# Patient Record
Sex: MALE | Race: ASIAN | NOT HISPANIC OR LATINO
[De-identification: names, ages, dates, MRNs, and addresses within clinical notes are randomized per-mention and may not be internally consistent; named-entity substitution may affect disease eponyms.]

---

## 2019-03-04 PROBLEM — Z00.00 ENCOUNTER FOR PREVENTIVE HEALTH EXAMINATION: Status: ACTIVE | Noted: 2019-03-04

## 2019-03-07 ENCOUNTER — APPOINTMENT (OUTPATIENT)
Dept: UROLOGY | Facility: CLINIC | Age: 59
End: 2019-03-07
Payer: COMMERCIAL

## 2019-03-07 VITALS
OXYGEN SATURATION: 97 % | SYSTOLIC BLOOD PRESSURE: 117 MMHG | RESPIRATION RATE: 18 BRPM | TEMPERATURE: 97.4 F | WEIGHT: 182 LBS | HEART RATE: 71 BPM | HEIGHT: 66.5 IN | DIASTOLIC BLOOD PRESSURE: 75 MMHG | BODY MASS INDEX: 28.9 KG/M2

## 2019-03-07 DIAGNOSIS — Z78.9 OTHER SPECIFIED HEALTH STATUS: ICD-10-CM

## 2019-03-07 DIAGNOSIS — E11.9 TYPE 2 DIABETES MELLITUS W/OUT COMPLICATIONS: ICD-10-CM

## 2019-03-07 DIAGNOSIS — Z80.0 FAMILY HISTORY OF MALIGNANT NEOPLASM OF DIGESTIVE ORGANS: ICD-10-CM

## 2019-03-07 PROCEDURE — 76872 US TRANSRECTAL: CPT

## 2019-03-07 PROCEDURE — 99204 OFFICE O/P NEW MOD 45 MIN: CPT | Mod: 25

## 2019-03-07 RX ORDER — METFORMIN HYDROCHLORIDE 850 MG/1
850 TABLET, COATED ORAL
Qty: 60 | Refills: 0 | Status: ACTIVE | COMMUNITY
Start: 2019-01-19

## 2019-03-07 RX ORDER — TAMSULOSIN HYDROCHLORIDE 0.4 MG/1
0.4 CAPSULE ORAL
Qty: 60 | Refills: 0 | Status: ACTIVE | COMMUNITY
Start: 2019-03-03

## 2019-03-07 RX ORDER — GLIPIZIDE 5 MG/1
5 TABLET, FILM COATED, EXTENDED RELEASE ORAL
Qty: 90 | Refills: 0 | Status: ACTIVE | COMMUNITY
Start: 2018-12-19

## 2019-03-07 RX ORDER — PIOGLITAZONE HYDROCHLORIDE 15 MG/1
15 TABLET ORAL
Qty: 60 | Refills: 0 | Status: ACTIVE | COMMUNITY
Start: 2019-01-19

## 2019-03-07 RX ORDER — PIOGLITAZONE HYDROCHLORIDE AND METFORMIN HYDROCHLORIDE 15; 850 MG/1; MG/1
15-850 TABLET, FILM COATED ORAL
Qty: 60 | Refills: 0 | Status: ACTIVE | COMMUNITY
Start: 2019-02-25

## 2019-03-07 RX ORDER — GLIPIZIDE 5 MG/1
5 TABLET ORAL
Qty: 30 | Refills: 0 | Status: ACTIVE | COMMUNITY
Start: 2018-12-11

## 2019-03-07 NOTE — ASSESSMENT
[FreeTextEntry1] : Patient is a 60 yo M who presents with BPH/LUTS. DERRICK with BPH. Improved modestly with flomax 0.8.\par \par PVR today acceptable\par PSA today\par Prostate sono showed 87cc gland\par Discussed with pt adding finasteride for combination medical therapy, d/w pt that will take 4-6 mos see any effect, and he would require lifelong use.  Additionally d/w pt side effect\par D/w pt BPH surgeries - including TURP, laser vaporization, enucleation ie HoLEP, SPP (open or robotic) \par D/w pt that there is a new technology for BPH to use water vapor Rezum which can reduce the size and has significantly lower side effect profile.  However long term data is lacking.\par After discussion pt is interestedin Rezum.  Will contact to schedule as explained only in our [place] office and would be performed under local anesthesia transrectal/transurethral.  Also d/w pt that he may have dysuria/irritative voiding for 1 month after\par

## 2019-03-07 NOTE — HISTORY OF PRESENT ILLNESS
[FreeTextEntry1] : Patient is a 60 yo M who presents for BPH/LUTS - difficulty voiding, straining, weak stream, nocturia 3-4 (improved to 1 on flomax 0.8), sensation of incomplete emptying and postvoid dribbling.\par \par He reports LUTS have worsened over past 6 months, he was started on flomax and notes taking 0.8 mg at night has helped.\par \par  [Urinary Incontinence] : no urinary incontinence [Dysuria] : no dysuria [Hematuria - Gross] : no gross hematuria [Flank Pain] : no flank pain

## 2019-03-19 LAB
APPEARANCE: CLEAR
BILIRUBIN URINE: NEGATIVE
BLOOD URINE: NEGATIVE
COLOR: COLORLESS
GLUCOSE QUALITATIVE U: ABNORMAL
KETONES URINE: NEGATIVE
LEUKOCYTE ESTERASE URINE: NEGATIVE
NITRITE URINE: NEGATIVE
PH URINE: 6
PROTEIN URINE: NEGATIVE
PSA SERPL-MCNC: 3.66 NG/ML
SPECIFIC GRAVITY URINE: 1.01
UROBILINOGEN URINE: NORMAL

## 2019-04-23 ENCOUNTER — OTHER (OUTPATIENT)
Age: 59
End: 2019-04-23

## 2019-04-23 ENCOUNTER — APPOINTMENT (OUTPATIENT)
Dept: UROLOGY | Facility: CLINIC | Age: 59
End: 2019-04-23

## 2019-04-23 ENCOUNTER — APPOINTMENT (OUTPATIENT)
Dept: UROLOGY | Facility: CLINIC | Age: 59
End: 2019-04-23
Payer: COMMERCIAL

## 2019-04-23 VITALS
DIASTOLIC BLOOD PRESSURE: 81 MMHG | HEART RATE: 77 BPM | RESPIRATION RATE: 16 BRPM | SYSTOLIC BLOOD PRESSURE: 135 MMHG | TEMPERATURE: 97.6 F

## 2019-04-23 PROCEDURE — 53854Z: CUSTOM

## 2019-04-23 RX ORDER — IBUPROFEN 600 MG/1
600 TABLET, FILM COATED ORAL
Qty: 30 | Refills: 0 | Status: ACTIVE | COMMUNITY
Start: 2019-04-23 | End: 1900-01-01

## 2019-04-26 ENCOUNTER — APPOINTMENT (OUTPATIENT)
Dept: UROLOGY | Facility: CLINIC | Age: 59
End: 2019-04-26
Payer: COMMERCIAL

## 2019-04-26 PROCEDURE — 99024 POSTOP FOLLOW-UP VISIT: CPT

## 2019-04-27 ENCOUNTER — MESSAGE (OUTPATIENT)
Age: 59
End: 2019-04-27

## 2019-04-29 NOTE — HISTORY OF PRESENT ILLNESS
[FreeTextEntry1] : Patient is a 60 yo M who presents for BPH/LUTS - difficulty voiding, straining, weak stream, nocturia 3-4 (improved to 1 on flomax 0.8), sensation of incomplete emptying and postvoid dribbling.\par \par He reports LUTS have worsened over past 6 months, he was started on flomax and notes taking 0.8 mg at night has helped.\par \par He is now s/p Rezum water vaporization.  Here for catheter removal.  He notes urine color is still light hematuric.\par \par

## 2019-04-29 NOTE — ADDENDUM
[FreeTextEntry1] : Failed TOV\par Voided small amounts and PVR 250cc\par New sterile 16 leonard replaced\par F/u next wk for TOV

## 2019-04-29 NOTE — PHYSICAL EXAM
[General Appearance - Well Developed] : well developed [General Appearance - Well Nourished] : well nourished [Normal Appearance] : normal appearance [Well Groomed] : well groomed [Urinary Bladder Findings] : the bladder was normal on palpation [General Appearance - In No Acute Distress] : no acute distress [Scrotum] : the scrotum was normal [FreeTextEntry1] : leonard with light pink urine = draining clear no clots

## 2019-04-30 ENCOUNTER — APPOINTMENT (OUTPATIENT)
Dept: UROLOGY | Facility: CLINIC | Age: 59
End: 2019-04-30

## 2019-05-02 ENCOUNTER — APPOINTMENT (OUTPATIENT)
Dept: UROLOGY | Facility: CLINIC | Age: 59
End: 2019-05-02
Payer: COMMERCIAL

## 2019-05-02 VITALS
TEMPERATURE: 98 F | SYSTOLIC BLOOD PRESSURE: 151 MMHG | HEART RATE: 66 BPM | RESPIRATION RATE: 16 BRPM | DIASTOLIC BLOOD PRESSURE: 97 MMHG

## 2019-05-02 PROCEDURE — 99024 POSTOP FOLLOW-UP VISIT: CPT | Mod: GT

## 2019-05-02 NOTE — ASSESSMENT
[FreeTextEntry1] : Bladder filled, catheter removed.\par Was able to void initially.\par \par 2 hours later returned to office with symptoms of retention.\par 18 Fr Coude Mendoza placed with drainage of 400 mL clear urine.\par \par Follow up with Dr. Castellon next week.\par Continue tamsulosin.

## 2019-05-02 NOTE — HISTORY OF PRESENT ILLNESS
[FreeTextEntry1] : s/p Rex prostate vaporization 4/23/2019.\par Here for catheter removal.\par Urine is clear.  Uncomfortable with catheter.

## 2019-05-02 NOTE — PHYSICAL EXAM
[FreeTextEntry1] : Mild distress [Normal Appearance] : normal appearance [Abdomen Soft] : soft [Costovertebral Angle Tenderness] : no ~M costovertebral angle tenderness [Abdomen Tenderness] : non-tender [Urinary Bladder Findings] : the bladder was normal on palpation

## 2019-05-03 ENCOUNTER — APPOINTMENT (OUTPATIENT)
Dept: UROLOGY | Facility: CLINIC | Age: 59
End: 2019-05-03
Payer: COMMERCIAL

## 2019-05-03 PROCEDURE — 99024 POSTOP FOLLOW-UP VISIT: CPT

## 2019-05-03 RX ORDER — OXYBUTYNIN CHLORIDE 5 MG/1
5 TABLET ORAL TWICE DAILY
Qty: 28 | Refills: 0 | Status: ACTIVE | COMMUNITY
Start: 2019-05-03 | End: 1900-01-01

## 2019-05-03 NOTE — ASSESSMENT
[FreeTextEntry1] : Mr. Cummigns presented today with complaints of his catheter not draining.  A new 18 Fr coude was placed yesterday by Dr. Mao\par -No visible clots seen in urinary drainage bag.\par -As per patient he last emptied the bag 2 hours ago and there was noted to be 200 cc clear yellow urine\par -Catheter irrigated without any difficulty with 300 cc sterile water, no resistance met, no clots or blood visible during irrigation.\par -New drainage bag attached to catheter and clear urine noted to be draining.\par -Discussed he may be experiencing some spasms related to procedure and number of times he has had to have a catheter reinserted.\par -Continue Flomax\par -Trail of Oxybutynin\par -Instructed to call office if catheter stops draining, he develops a fever, or large amounts of blood are draining into the catheter bag.\par \par instructed to follow up with Dr. Castellon on Friday

## 2019-05-03 NOTE — HISTORY OF PRESENT ILLNESS
[FreeTextEntry1] : CC: Urinary catheter not drianing\par \par Patient returns to the office today stating his catheter is not working.  He reports since he left the office yesterday he feels like it drains "ok" and then he has to push to get the urine out.  When he pushes to urinate he notices some urine leaks out around the catheter.\par He also noted blood clots in the urinary drainage bag.\par Mr. Cummings had Rezum prostate vaporization 4/23/19 and as per the patient he has had a catheter placed 5 different times since then.  \par He was prescribed an antibiotic by his son who is a Doctor who works in an ICU.  Currently afebrile, denies any nausea or vomiting.

## 2019-05-03 NOTE — PHYSICAL EXAM
[General Appearance - Well Developed] : well developed [General Appearance - Well Nourished] : well nourished [General Appearance - In No Acute Distress] : no acute distress [Normal Appearance] : normal appearance [Costovertebral Angle Tenderness] : no ~M costovertebral angle tenderness [Abdomen Soft] : soft [Abdomen Tenderness] : non-tender [] : no respiratory distress [Urinary Bladder Findings] : the bladder was normal on palpation [Oriented To Time, Place, And Person] : oriented to person, place, and time [Exaggerated Use Of Accessory Muscles For Inspiration] : no accessory muscle use

## 2019-05-07 ENCOUNTER — MOBILE ON CALL (OUTPATIENT)
Age: 59
End: 2019-05-07

## 2019-05-10 ENCOUNTER — APPOINTMENT (OUTPATIENT)
Dept: UROLOGY | Facility: CLINIC | Age: 59
End: 2019-05-10
Payer: COMMERCIAL

## 2019-05-10 VITALS
HEART RATE: 76 BPM | TEMPERATURE: 97.9 F | SYSTOLIC BLOOD PRESSURE: 125 MMHG | RESPIRATION RATE: 16 BRPM | DIASTOLIC BLOOD PRESSURE: 81 MMHG

## 2019-05-10 PROCEDURE — 99024 POSTOP FOLLOW-UP VISIT: CPT

## 2019-05-10 NOTE — HISTORY OF PRESENT ILLNESS
[FreeTextEntry1] : Patient is a 58 yo M who presents for BPH/LUTS - difficulty voiding, straining, weak stream, nocturia 3-4 (improved to 1 on flomax 0.8), sensation of incomplete emptying and postvoid dribbling.\par \par He reports LUTS have worsened over past 6 months, he was started on flomax and notes taking 0.8 mg at night has helped.\par \par He is now s/p Rezum water vaporization 4/23/19.  Here for catheter removal.  Urine color has cleared.  He has failed multiple TOVs.  No fever/chills.  He took few days of abx last wk and then restarted last night - was given by his son.\par

## 2019-05-10 NOTE — PHYSICAL EXAM
[General Appearance - Well Developed] : well developed [General Appearance - Well Nourished] : well nourished [Normal Appearance] : normal appearance [Well Groomed] : well groomed [General Appearance - In No Acute Distress] : no acute distress [Urethral Meatus] : meatus normal [Urinary Bladder Findings] : the bladder was normal on palpation [Scrotum] : the scrotum was normal [FreeTextEntry1] : leonard in place with clear yellow urine

## 2019-05-10 NOTE — ASSESSMENT
[FreeTextEntry1] : Patient is a 60 yo M who presents with BPH/LUTS. DERRICK with BPH. Improved modestly with flomax 0.8.\par \par BPH s/p Rezum\par TOV today unsuccessful \par Catheter replaced\par F/u next wk for repeat TOV

## 2019-05-16 ENCOUNTER — APPOINTMENT (OUTPATIENT)
Dept: UROLOGY | Facility: CLINIC | Age: 59
End: 2019-05-16
Payer: COMMERCIAL

## 2019-05-16 VITALS
BODY MASS INDEX: 29.57 KG/M2 | RESPIRATION RATE: 16 BRPM | SYSTOLIC BLOOD PRESSURE: 111 MMHG | HEART RATE: 65 BPM | TEMPERATURE: 97.5 F | OXYGEN SATURATION: 98 % | WEIGHT: 186 LBS | DIASTOLIC BLOOD PRESSURE: 73 MMHG

## 2019-05-16 PROCEDURE — 99024 POSTOP FOLLOW-UP VISIT: CPT

## 2019-05-16 PROCEDURE — 76872 US TRANSRECTAL: CPT

## 2019-05-16 NOTE — ASSESSMENT
[FreeTextEntry1] : Patient is a 60 yo M who presents with BPH/LUTS. DERRICK with BPH. Improved modestly with flomax 0.8.\par \par BPH s/p Rezum\par TOV today - again unsuccessful\par D/w pt self cathing, he is not interested\par New sterile leonard replaced\par Instructed to stop oxybutynin\par Will check urine for cx\par F/u 1 wk

## 2019-05-16 NOTE — HISTORY OF PRESENT ILLNESS
[FreeTextEntry1] : Patient is a 58 yo M who presents for BPH/LUTS - difficulty voiding, straining, weak stream, nocturia 3-4 (improved to 1 on flomax 0.8), sensation of incomplete emptying and postvoid dribbling.\par \par He reports LUTS have worsened over past 6 months, he was started on flomax and notes taking 0.8 mg at night has helped.\par \par He is now s/p Rezum water vaporization 4/23/19.  Here for catheter removal.  Urine color is clear yellow.  He has failed multiple TOVs.  No fever/chills.

## 2019-05-16 NOTE — PHYSICAL EXAM
[General Appearance - Well Nourished] : well nourished [General Appearance - Well Developed] : well developed [Normal Appearance] : normal appearance [Well Groomed] : well groomed [Urethral Meatus] : meatus normal [General Appearance - In No Acute Distress] : no acute distress [Scrotum] : the scrotum was normal [Urinary Bladder Findings] : the bladder was normal on palpation [FreeTextEntry1] : leonard in place with clear yellow urine

## 2019-05-20 LAB — BACTERIA UR CULT: NORMAL

## 2019-05-24 ENCOUNTER — APPOINTMENT (OUTPATIENT)
Dept: UROLOGY | Facility: CLINIC | Age: 59
End: 2019-05-24
Payer: COMMERCIAL

## 2019-05-24 PROCEDURE — 99024 POSTOP FOLLOW-UP VISIT: CPT

## 2019-05-24 PROCEDURE — 51798 US URINE CAPACITY MEASURE: CPT

## 2019-05-24 NOTE — ASSESSMENT
[FreeTextEntry1] : Patient is a 58 yo M who presents with BPH/LUTS. DERRICK with BPH. Improved modestly with flomax 0.8.\par \par BPH s/p Rezum\par TOV today  - successful\par He was able to void multiple times and felt empty\par PVR 100cc\par Voided 800cc in total\par F/u 3 wks

## 2019-05-24 NOTE — PHYSICAL EXAM
[General Appearance - Well Developed] : well developed [General Appearance - Well Nourished] : well nourished [Normal Appearance] : normal appearance [Well Groomed] : well groomed [General Appearance - In No Acute Distress] : no acute distress [Urethral Meatus] : meatus normal [Urinary Bladder Findings] : the bladder was normal on palpation [Scrotum] : the scrotum was normal [FreeTextEntry1] : leonard with clear yellow urine - removed with ease

## 2019-06-14 ENCOUNTER — APPOINTMENT (OUTPATIENT)
Dept: UROLOGY | Facility: CLINIC | Age: 59
End: 2019-06-14
Payer: COMMERCIAL

## 2019-06-14 VITALS
RESPIRATION RATE: 16 BRPM | TEMPERATURE: 97.8 F | SYSTOLIC BLOOD PRESSURE: 117 MMHG | DIASTOLIC BLOOD PRESSURE: 72 MMHG | HEART RATE: 78 BPM

## 2019-06-14 LAB
BILIRUB UR QL STRIP: NEGATIVE
CLARITY UR: CLEAR
COLLECTION METHOD: NORMAL
GLUCOSE UR-MCNC: NORMAL
HCG UR QL: 0.2 EU/DL
HGB UR QL STRIP.AUTO: NORMAL
KETONES UR-MCNC: NEGATIVE
LEUKOCYTE ESTERASE UR QL STRIP: NEGATIVE
NITRITE UR QL STRIP: NEGATIVE
PH UR STRIP: 6
PROT UR STRIP-MCNC: NEGATIVE
SP GR UR STRIP: 1.02

## 2019-06-14 PROCEDURE — 99024 POSTOP FOLLOW-UP VISIT: CPT

## 2019-06-14 PROCEDURE — 51798 US URINE CAPACITY MEASURE: CPT

## 2019-06-14 NOTE — PHYSICAL EXAM
[General Appearance - Well Developed] : well developed [General Appearance - Well Nourished] : well nourished [Normal Appearance] : normal appearance [Well Groomed] : well groomed [General Appearance - In No Acute Distress] : no acute distress [Penis Abnormality] : normal uncircumcised penis [Scrotum] : the scrotum was normal [Testes Mass (___cm)] : there were no testicular masses [Rectal Exam - Rectum] : digital rectal exam was normal [Oriented To Time, Place, And Person] : oriented to person, place, and time [Affect] : the affect was normal [Mood] : the mood was normal [Not Anxious] : not anxious

## 2019-06-14 NOTE — HISTORY OF PRESENT ILLNESS
[FreeTextEntry1] : Patient is a 60 yo M who presents for BPH/LUTS - difficulty voiding, straining, weak stream, nocturia 3-4 (improved to 1 on flomax 0.8), sensation of incomplete emptying and postvoid dribbling.\par \par He reports LUTS have worsened over past 6 months, he was started on flomax and notes taking 0.8 mg at night has helped.\par \par He is now s/p Rezum water vaporization 4/23/19.  He had urinary retention for 1 month but has now been voiding for several weeks.  Reports stream is better after Rezum, but still with increased frequency.  He is only taking flomax 0.4 daily now, not 0.8mg.

## 2019-06-14 NOTE — ASSESSMENT
[FreeTextEntry1] : Patient is a 58 yo M who presents with BPH/LUTS. DERRICK with BPH. Improved modestly with flomax 0.8.\par \par BPH s/p Rezum\par Voiding well but still with irritative LUTS, reassured that may still be related to post-rezum effects\par F/u 2 mos

## 2019-08-16 ENCOUNTER — APPOINTMENT (OUTPATIENT)
Dept: UROLOGY | Facility: CLINIC | Age: 59
End: 2019-08-16

## 2020-08-23 ENCOUNTER — EMERGENCY (EMERGENCY)
Facility: HOSPITAL | Age: 60
LOS: 1 days | Discharge: HOME | End: 2020-08-23
Attending: EMERGENCY MEDICINE
Payer: MEDICAID

## 2020-08-23 VITALS
SYSTOLIC BLOOD PRESSURE: 146 MMHG | TEMPERATURE: 98 F | RESPIRATION RATE: 16 BRPM | OXYGEN SATURATION: 97 % | HEART RATE: 65 BPM | DIASTOLIC BLOOD PRESSURE: 75 MMHG

## 2020-08-23 VITALS
HEIGHT: 66.14 IN | TEMPERATURE: 98 F | HEART RATE: 80 BPM | OXYGEN SATURATION: 99 % | WEIGHT: 179.9 LBS | SYSTOLIC BLOOD PRESSURE: 179 MMHG | RESPIRATION RATE: 18 BRPM | DIASTOLIC BLOOD PRESSURE: 98 MMHG

## 2020-08-23 PROCEDURE — 99284 EMERGENCY DEPT VISIT MOD MDM: CPT

## 2020-08-23 PROCEDURE — 70450 CT HEAD/BRAIN W/O DYE: CPT

## 2020-08-23 PROCEDURE — 93005 ELECTROCARDIOGRAM TRACING: CPT

## 2020-08-23 PROCEDURE — 99285 EMERGENCY DEPT VISIT HI MDM: CPT | Mod: 25

## 2020-08-23 PROCEDURE — 82962 GLUCOSE BLOOD TEST: CPT

## 2020-08-23 PROCEDURE — 70450 CT HEAD/BRAIN W/O DYE: CPT | Mod: 26

## 2020-08-23 RX ORDER — ACETAMINOPHEN 500 MG
650 TABLET ORAL ONCE
Refills: 0 | Status: COMPLETED | OUTPATIENT
Start: 2020-08-23 | End: 2020-08-23

## 2020-08-23 RX ADMIN — Medication 650 MILLIGRAM(S): at 14:18

## 2020-08-23 RX ADMIN — Medication 650 MILLIGRAM(S): at 14:57
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
## 2020-08-23 NOTE — ED PROVIDER NOTE - PATIENT PORTAL LINK FT
You can access the FollowMyHealth Patient Portal offered by [facility] by registering at the following website: [email or web address]. By joining [facility]’s FollowMyHealth portal, you will also be able to view your health information using other applications (apps) compatible with our system.
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
## 2020-08-23 NOTE — ED PROVIDER NOTE - CLINICAL SUMMARY MEDICAL DECISION MAKING FREE TEXT BOX
59 y/o M presents with dizziness, possibly postconcussive syndrome vs arrhythmia. Will obtain EKG, CT head for baseline to rule out intracranial hemorrhage although unlikely, fingerstick and give Tylenol.
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
## 2020-08-26 LAB — GLUCOSE BLDC GLUCOMTR-MCNC: 297 MG/DL — HIGH (ref 70–99)

## 2022-12-30 PROBLEM — E11.9 TYPE 2 DIABETES MELLITUS WITHOUT COMPLICATIONS: Chronic | Status: ACTIVE | Noted: 2020-08-23

## 2023-01-04 ENCOUNTER — APPOINTMENT (OUTPATIENT)
Dept: UROLOGY | Facility: CLINIC | Age: 63
End: 2023-01-04

## 2023-01-30 ENCOUNTER — APPOINTMENT (OUTPATIENT)
Age: 63
End: 2023-01-30

## 2023-01-30 ENCOUNTER — APPOINTMENT (OUTPATIENT)
Dept: UROLOGY | Facility: CLINIC | Age: 63
End: 2023-01-30
Payer: MEDICAID

## 2023-01-30 VITALS
OXYGEN SATURATION: 98 % | SYSTOLIC BLOOD PRESSURE: 112 MMHG | DIASTOLIC BLOOD PRESSURE: 69 MMHG | BODY MASS INDEX: 28.46 KG/M2 | TEMPERATURE: 97.5 F | HEART RATE: 64 BPM | WEIGHT: 179 LBS | RESPIRATION RATE: 16 BRPM

## 2023-01-30 LAB
BILIRUB UR QL STRIP: NEGATIVE
CLARITY UR: CLEAR
COLLECTION METHOD: NORMAL
GLUCOSE UR-MCNC: 500
HCG UR QL: 0.2 EU/DL
HGB UR QL STRIP.AUTO: NORMAL
KETONES UR-MCNC: NEGATIVE
LEUKOCYTE ESTERASE UR QL STRIP: NEGATIVE
NITRITE UR QL STRIP: NEGATIVE
PH UR STRIP: 6.5
PROT UR STRIP-MCNC: NEGATIVE
SP GR UR STRIP: 1.01

## 2023-01-30 PROCEDURE — 99204 OFFICE O/P NEW MOD 45 MIN: CPT | Mod: 25

## 2023-01-30 PROCEDURE — 51798 US URINE CAPACITY MEASURE: CPT

## 2023-01-30 NOTE — ASSESSMENT
[FreeTextEntry1] : Patient is a 61 yo M who presents for BPH/LUTS.\par \par Currently controlled on combo therapy - tamsulosin 0.8 and finasteride\par PVR today 20cc\par Udip\par Hold off PSA as recent AUR\par F/u for prostate US\par

## 2023-01-30 NOTE — HISTORY OF PRESENT ILLNESS
[FreeTextEntry1] : Patient is a 61 yo M who presents for BPH/LUTS.\par \par He was initially seen in 3/2019 - difficulty voiding, straining, weak stream, nocturia 3-4 (improved to 1 on flomax 0.8), sensation of incomplete emptying and postvoid dribbling.  He was increased to 0.8 mg dosing as well.\par He then underwent Rezum water vaporization 4/23/19.   After Rezum he had improvement in LUTS.  He even stopped meds.  He was then LTFU for 3 years.  However over past months he has had worsened LUTS.   He had an episode of acute urinary retention, and was seen in ER and had leonard placed. He was restarted on flomax 0.8 mg and finasteride 5mg in 12/2022.  Catheter removed after several days on medication.  He then had back/spine surgery 2 wks ago, had leonard periop and voided postop.\par On medication, he reports better LUTS, nocturia x3 (from 4-5 prior), improvement in FOS and straining.\par \par Currently he feels voiding stably.  No dysuria or retention.\par \par

## 2023-01-30 NOTE — PHYSICAL EXAM
[General Appearance - Well Developed] : well developed [General Appearance - Well Nourished] : well nourished [Normal Appearance] : normal appearance [Well Groomed] : well groomed [General Appearance - In No Acute Distress] : no acute distress [Edema] : no peripheral edema [Respiration, Rhythm And Depth] : normal respiratory rhythm and effort [Exaggerated Use Of Accessory Muscles For Inspiration] : no accessory muscle use [Abdomen Soft] : soft [Abdomen Tenderness] : non-tender [Costovertebral Angle Tenderness] : no ~M costovertebral angle tenderness [Urethral Meatus] : meatus normal [Urinary Bladder Findings] : the bladder was normal on palpation [Scrotum] : the scrotum was normal [Testes Mass (___cm)] : there were no testicular masses [Prostate Tenderness] : the prostate was not tender [No Prostate Nodules] : no prostate nodules [Prostate Size ___ gm] : prostate size [unfilled] gm [Normal Station and Gait] : the gait and station were normal for the patient's age [FreeTextEntry1] : back with dressing and BOGDAN drain [] : no rash [No Focal Deficits] : no focal deficits [Oriented To Time, Place, And Person] : oriented to person, place, and time [Affect] : the affect was normal [Mood] : the mood was normal [Not Anxious] : not anxious

## 2023-02-27 ENCOUNTER — APPOINTMENT (OUTPATIENT)
Dept: UROLOGY | Facility: CLINIC | Age: 63
End: 2023-02-27
Payer: MEDICAID

## 2023-02-27 VITALS — TEMPERATURE: 97.6 F

## 2023-02-27 PROCEDURE — 99214 OFFICE O/P EST MOD 30 MIN: CPT

## 2023-02-27 PROCEDURE — 76872 US TRANSRECTAL: CPT

## 2023-02-27 NOTE — ASSESSMENT
[FreeTextEntry1] : Patient is a 64 yo M who presents for recurrent LUTs and BPH.\par \par -He is s/p Rezum in 2019, LTFU.  Recent UTI and recurrent LUTS.\par -he has resumed combo BPH therapy which is helping control his LUTS\par -today TRUS 87cc prostate\par -d/w pt that he should undergo enucleation procedure of prostate\par -d/w pt that I do not perform Thulep/holep - would refer to partner in [place]\par -d/w pt that there are risks of laser enucleation of prostate including bleeding, infection, stricture\par -refer to enucleation specialist

## 2023-02-27 NOTE — HISTORY OF PRESENT ILLNESS
[FreeTextEntry1] : Patient is a 62 yo M who presents for BPH/LUTS.\par \par He was initially seen in 3/2019 - difficulty voiding, straining, weak stream, nocturia 3-4 (improved to 1 on flomax 0.8), sensation of incomplete emptying and postvoid dribbling.  He was increased to 0.8 mg dosing as well.\par He then underwent Rezum water vaporization 4/23/19.   After Rezum he had improvement in LUTS.  He even stopped meds.  \par \par He was then LTFU for 3 years.  However over past months he has had worsened LUTS.   He had an episode of acute urinary retention, and was seen in ER and had leonard placed. He was restarted on flomax 0.8 mg and finasteride 5mg in 12/2022.  Catheter removed after several days on medication.  He then had back/spine surgery 2 wks ago, had leonard periop and voided postop.\par On medication, he reports better LUTS, nocturia x3 (from 4-5 prior), improvement in FOS and straining.\par \par Currently he feels voiding stably.  No dysuria or retention.\par TRUS today showed 87cc.\par \par

## 2023-03-24 ENCOUNTER — APPOINTMENT (OUTPATIENT)
Dept: UROLOGY | Facility: CLINIC | Age: 63
End: 2023-03-24
Payer: MEDICAID

## 2023-03-24 VITALS
SYSTOLIC BLOOD PRESSURE: 120 MMHG | TEMPERATURE: 96.9 F | WEIGHT: 179 LBS | DIASTOLIC BLOOD PRESSURE: 76 MMHG | BODY MASS INDEX: 28.77 KG/M2 | HEIGHT: 66 IN | HEART RATE: 62 BPM | RESPIRATION RATE: 15 BRPM | OXYGEN SATURATION: 97 %

## 2023-03-24 DIAGNOSIS — N13.8 BENIGN PROSTATIC HYPERPLASIA WITH LOWER URINARY TRACT SYMPMS: ICD-10-CM

## 2023-03-24 DIAGNOSIS — R33.9 RETENTION OF URINE, UNSPECIFIED: ICD-10-CM

## 2023-03-24 DIAGNOSIS — N40.1 BENIGN PROSTATIC HYPERPLASIA WITH LOWER URINARY TRACT SYMPMS: ICD-10-CM

## 2023-03-24 PROCEDURE — 99215 OFFICE O/P EST HI 40 MIN: CPT

## 2023-03-24 NOTE — HISTORY OF PRESENT ILLNESS
[FreeTextEntry1] :  # [id number] [place]\par \par See notes from Dr. Castellon\par 87 cc prostate by TRUS\par prior Rezum\par Recurrent LUTS--weak stream, freq q 1hr, weak stream, nocturia x 4--slight improvement with meds (Tamsulosin morning and night--prescribed by PCP)\par Last PSA on record from 2019: 3.66\par \par

## 2023-03-24 NOTE — PHYSICAL EXAM
[General Appearance - Well Developed] : well developed [General Appearance - Well Nourished] : well nourished [Normal Appearance] : normal appearance [Well Groomed] : well groomed [General Appearance - In No Acute Distress] : no acute distress [Abdomen Soft] : soft [Abdomen Tenderness] : non-tender [Costovertebral Angle Tenderness] : no ~M costovertebral angle tenderness [Urethral Meatus] : meatus normal [Urinary Bladder Findings] : the bladder was normal on palpation [Scrotum] : the scrotum was normal [Skin Color & Pigmentation] : normal skin color and pigmentation [] : no respiratory distress [Respiration, Rhythm And Depth] : normal respiratory rhythm and effort [Exaggerated Use Of Accessory Muscles For Inspiration] : no accessory muscle use [Oriented To Time, Place, And Person] : oriented to person, place, and time [Normal Station and Gait] : the gait and station were normal for the patient's age

## 2023-03-24 NOTE — ASSESSMENT
[FreeTextEntry1] : Discussed Laser enucleation of prostate with morcellation (HOLEP/ThuLEP).\par \par Indications, options including chronic Mendoza catheter, suprapubic catheter, intermittent self-catheterization, transurethral resection of prostate, transurethral vaporization of prostate with laser or electrocautery, open or laparoscopic enucleation of the prostate, all discussed.\par \par Potential complications of transurethral holmium or thulium laser enucleation of prostate with morcellation discussed. This included risk of infection, bleeding, transfusion, conversion to open enucleation, need for staged procedure, adjacent organ injury, bladder injury, clot retention of urine requiring return to operating room for cystoscopy clot evacuation, prolonged duration of Mendoza catheterization, urethral stricture, transient or permanent urinary incontinence, retrograde ejaculation is a permanent and irreversible complication, redo or staged surgery due to equipment malfunction, anesthetic complications, cardiac complications, all discussed. \par \par Printed information including of the above and other more uncommon complications provided to patient.\par \par He will review and call to schedule if he wishes to proceed with surgery.

## 2024-01-15 NOTE — PHYSICAL EXAM
[General Appearance - Well Developed] : well developed [General Appearance - Well Nourished] : well nourished [Normal Appearance] : normal appearance [Well Groomed] : well groomed [General Appearance - In No Acute Distress] : no acute distress [Edema] : no peripheral edema [Respiration, Rhythm And Depth] : normal respiratory rhythm and effort [Exaggerated Use Of Accessory Muscles For Inspiration] : no accessory muscle use [Abdomen Soft] : soft Yes [Abdomen Tenderness] : non-tender [Costovertebral Angle Tenderness] : no ~M costovertebral angle tenderness [Urethral Meatus] : meatus normal [Urinary Bladder Findings] : the bladder was normal on palpation [Scrotum] : the scrotum was normal [Testes Tenderness] : no tenderness of the testes [Testes Mass (___cm)] : there were no testicular masses [Prostate Tenderness] : the prostate was not tender [No Prostate Nodules] : no prostate nodules [Prostate Size ___ gm] : prostate size [unfilled] gm [Normal Station and Gait] : the gait and station were normal for the patient's age [] : no rash [No Focal Deficits] : no focal deficits [Oriented To Time, Place, And Person] : oriented to person, place, and time [Affect] : the affect was normal [Mood] : the mood was normal [Not Anxious] : not anxious [No Palpable Adenopathy] : no palpable adenopathy [FreeTextEntry1] : sacral lipoma
